# Patient Record
Sex: FEMALE | Race: WHITE | NOT HISPANIC OR LATINO | Employment: OTHER | URBAN - METROPOLITAN AREA
[De-identification: names, ages, dates, MRNs, and addresses within clinical notes are randomized per-mention and may not be internally consistent; named-entity substitution may affect disease eponyms.]

---

## 2018-07-26 RX ORDER — DICYCLOMINE HYDROCHLORIDE 10 MG/1
CAPSULE ORAL
COMMUNITY
Start: 2014-01-06

## 2018-07-26 RX ORDER — EPINEPHRINE 0.3 MG/.3ML
INJECTION SUBCUTANEOUS
COMMUNITY
Start: 2017-08-08 | End: 2018-07-30 | Stop reason: SDUPTHER

## 2018-07-26 RX ORDER — MULTIVIT-MIN/IRON/FOLIC ACID/K 18-600-40
CAPSULE ORAL EVERY 24 HOURS
COMMUNITY
Start: 2014-01-06

## 2018-07-26 RX ORDER — LISINOPRIL 10 MG/1
TABLET ORAL EVERY 24 HOURS
COMMUNITY
Start: 2018-01-22 | End: 2018-07-30

## 2018-07-26 RX ORDER — METHYLPREDNISOLONE 4 MG
TABLET, DOSE PACK ORAL
COMMUNITY
Start: 2017-02-15

## 2018-07-26 RX ORDER — MULTIVITAMIN WITH IRON
TABLET ORAL
COMMUNITY
Start: 2014-01-06

## 2018-07-26 RX ORDER — FLUTICASONE PROPIONATE 50 MCG
SPRAY, SUSPENSION (ML) NASAL EVERY 24 HOURS
COMMUNITY
Start: 2017-03-17 | End: 2018-07-30

## 2018-07-30 ENCOUNTER — OFFICE VISIT (OUTPATIENT)
Dept: FAMILY MEDICINE CLINIC | Facility: CLINIC | Age: 63
End: 2018-07-30
Payer: COMMERCIAL

## 2018-07-30 VITALS
DIASTOLIC BLOOD PRESSURE: 88 MMHG | RESPIRATION RATE: 16 BRPM | WEIGHT: 243.4 LBS | BODY MASS INDEX: 41.55 KG/M2 | TEMPERATURE: 97 F | SYSTOLIC BLOOD PRESSURE: 136 MMHG | HEIGHT: 64 IN | HEART RATE: 72 BPM

## 2018-07-30 DIAGNOSIS — I10 ESSENTIAL HYPERTENSION: Primary | ICD-10-CM

## 2018-07-30 DIAGNOSIS — Z91.030 ALLERGY TO BEE STING: ICD-10-CM

## 2018-07-30 PROCEDURE — 3079F DIAST BP 80-89 MM HG: CPT | Performed by: FAMILY MEDICINE

## 2018-07-30 PROCEDURE — 1036F TOBACCO NON-USER: CPT | Performed by: FAMILY MEDICINE

## 2018-07-30 PROCEDURE — 99214 OFFICE O/P EST MOD 30 MIN: CPT | Performed by: FAMILY MEDICINE

## 2018-07-30 PROCEDURE — 3008F BODY MASS INDEX DOCD: CPT | Performed by: FAMILY MEDICINE

## 2018-07-30 PROCEDURE — 3075F SYST BP GE 130 - 139MM HG: CPT | Performed by: FAMILY MEDICINE

## 2018-07-30 RX ORDER — EPINEPHRINE 0.3 MG/.3ML
0.3 INJECTION SUBCUTANEOUS ONCE
Qty: 0.3 ML | Refills: 0 | Status: SHIPPED | OUTPATIENT
Start: 2018-07-30 | End: 2018-07-30

## 2018-07-30 RX ORDER — LISINOPRIL 2.5 MG/1
2.5 TABLET ORAL DAILY
Qty: 30 TABLET | Refills: 5 | Status: SHIPPED | OUTPATIENT
Start: 2018-07-30

## 2018-07-30 RX ORDER — LISINOPRIL 5 MG/1
5 TABLET ORAL DAILY
Qty: 30 TABLET | Refills: 5 | Status: SHIPPED | OUTPATIENT
Start: 2018-07-30

## 2018-07-30 NOTE — PROGRESS NOTES
Assessment/Plan:    No problem-specific Assessment & Plan notes found for this encounter  Diagnoses and all orders for this visit:    Essential hypertension  Comments:  D/C Lisinopril 10mg and start 7 5 (5 + 2 5)mg PO QD (Will be taking 2 tablets each day)  Continue to monitor BP    Orders:  -     CBC and differential; Future  -     Comprehensive metabolic panel; Future  -     TSH, 3rd generation; Future  -     lisinopril (ZESTRIL) 2 5 mg tablet; Take 1 tablet (2 5 mg total) by mouth daily  -     lisinopril (ZESTRIL) 5 mg tablet; Take 1 tablet (5 mg total) by mouth daily  -     Lipid Panel with Direct LDL reflex; Future    Allergy to bee sting  Comments:  Refill Epipen for future travel to country with known bees  Orders:  -     EPINEPHrine (EPIPEN) 0 3 mg/0 3 mL SOAJ; Inject 0 3 mL (0 3 mg total) into a muscle once for 1 dose    Other orders  -     Ascorbic Acid 500 MG/15ML LIQD; Take by mouth  -     dicyclomine (BENTYL) 10 mg capsule; take 1 capsule (10MG)  by oral route once daily  -     Cholecalciferol 1000 units CHEW; Chew  -     cyanocobalamin 100 MCG tablet; Take 1,000 mcg by mouth  -     Discontinue: EPINEPHrine (EPIPEN) 0 3 mg/0 3 mL SOAJ; inject 0 3 milliliter INTRMUSCULARLY if needed for ANAPHALAXIS  -     Discontinue: fluticasone (FLONASE) 50 mcg/act nasal spray; every 24 hours  -     Glucosamine Sulfate 500 MG TABS; take 2 capsules twice daily  -     Discontinue: lisinopril (ZESTRIL) 10 mg tablet; every 24 hours  -     Magnesium 250 MG TABS; take 3 tablets by mouth daily  -     Omega-3 Fatty Acids (OMEGA-3 FISH OIL PO); take 1 by Oral route  every day  -     Ascorbic Acid (VITAMIN C) 500 MG CAPS; every 24 hours  -     Pediatric Multivitamins-Iron (POLY-VI-SOL/IRON PO);   -     Cholecalciferol 4000 units CAPS; one capsule daily          Subjective:      Patient ID: Anjum Salgado is a 57 y o  female  HPI  Patient presents for routine follow-up of hypertension   Patient is doing well, only concern for low blood pressure reading around 90/60s on some days  Patient reports that she will delay taking her lisinopril in the morning and take it at night to prevent severe hypotension  Patient denies symptoms of dizziness  Last eye exam was 2-4 months ago and it was normal    The following portions of the patient's history were reviewed and updated as appropriate:   She  has a past medical history of Discomfort; Meralgia paresthetica; and Right facial numbness  She   Patient Active Problem List    Diagnosis Date Noted    Essential hypertension 2018    Allergy to bee sting 2018     She  has a past surgical history that includes Tonsillectomy and  section  Her family history includes Colon cancer in her father; Liver cancer in her brother; Lung cancer in her mother; Lymphoma in her sister; Stroke in her brother  She  reports that she has never smoked  She has never used smokeless tobacco  She reports that she does not drink alcohol or use drugs    Current Outpatient Prescriptions   Medication Sig Dispense Refill    Ascorbic Acid (VITAMIN C) 500 MG CAPS every 24 hours      Cholecalciferol 4000 units CAPS one capsule daily      dicyclomine (BENTYL) 10 mg capsule take 1 capsule (10MG)  by oral route once daily      EPINEPHrine (EPIPEN) 0 3 mg/0 3 mL SOAJ Inject 0 3 mL (0 3 mg total) into a muscle once for 1 dose 0 3 mL 0    Glucosamine Sulfate 500 MG TABS take 2 capsules twice daily      Magnesium 250 MG TABS take 3 tablets by mouth daily      Omega-3 Fatty Acids (OMEGA-3 FISH OIL PO) take 1 by Oral route  every day      Pediatric Multivitamins-Iron (POLY-VI-SOL/IRON PO)       Ascorbic Acid 500 MG/15ML LIQD Take by mouth      Cholecalciferol 1000 units CHEW Chew      cyanocobalamin 100 MCG tablet Take 1,000 mcg by mouth      lisinopril (ZESTRIL) 2 5 mg tablet Take 1 tablet (2 5 mg total) by mouth daily 30 tablet 5    lisinopril (ZESTRIL) 5 mg tablet Take 1 tablet (5 mg total) by mouth daily 30 tablet 5     No current facility-administered medications for this visit  No current outpatient prescriptions on file prior to visit  No current facility-administered medications on file prior to visit  She is allergic to codeine; gabapentin; and naproxen       Review of Systems   Constitutional: Negative  Eyes: Negative  Respiratory: Negative  Cardiovascular: Negative  Gastrointestinal: Negative  Endocrine: Negative  Skin: Negative  Neurological: Positive for light-headedness  Negative for syncope  Objective:      /88 (BP Location: Left arm, Patient Position: Sitting, Cuff Size: Large)   Pulse 72   Temp (!) 97 °F (36 1 °C) (Tympanic)   Resp 16   Ht 5' 4" (1 626 m)   Wt 110 kg (243 lb 6 4 oz)   BMI 41 78 kg/m²          Physical Exam   Constitutional: She appears well-developed and well-nourished  No distress  Neck: Normal range of motion  Neck supple  No thyromegaly present  Cardiovascular: Normal rate, regular rhythm, normal heart sounds and intact distal pulses  No murmur heard  Pulmonary/Chest: Effort normal and breath sounds normal  No respiratory distress  She has no wheezes  Musculoskeletal: She exhibits edema (mild 1+ in b/l lower extremities )  Skin: Skin is warm and dry  No rash noted  She is not diaphoretic  Vitals reviewed

## 2018-11-29 ENCOUNTER — TELEPHONE (OUTPATIENT)
Dept: FAMILY MEDICINE CLINIC | Facility: CLINIC | Age: 63
End: 2018-11-29

## 2019-06-19 ENCOUNTER — TELEPHONE (OUTPATIENT)
Dept: FAMILY MEDICINE CLINIC | Facility: CLINIC | Age: 64
End: 2019-06-19

## 2019-09-25 ENCOUNTER — TELEPHONE (OUTPATIENT)
Dept: FAMILY MEDICINE CLINIC | Facility: CLINIC | Age: 64
End: 2019-09-25

## 2019-09-25 NOTE — TELEPHONE ENCOUNTER
Went through chart to see if pt is still being seen and I could see in chart she is being treated by [facility]   Dr Ness Armendariz name removed